# Patient Record
Sex: MALE | Race: WHITE | Employment: FULL TIME | URBAN - METROPOLITAN AREA
[De-identification: names, ages, dates, MRNs, and addresses within clinical notes are randomized per-mention and may not be internally consistent; named-entity substitution may affect disease eponyms.]

---

## 2020-03-11 ENCOUNTER — HOSPITAL ENCOUNTER (EMERGENCY)
Age: 50
Discharge: HOME | End: 2020-03-11
Attending: EMERGENCY MEDICINE
Payer: COMMERCIAL

## 2020-03-11 VITALS
BODY MASS INDEX: 30.92 KG/M2 | OXYGEN SATURATION: 99 % | WEIGHT: 221.7 LBS | RESPIRATION RATE: 16 BRPM | DIASTOLIC BLOOD PRESSURE: 87 MMHG | TEMPERATURE: 98.3 F | SYSTOLIC BLOOD PRESSURE: 133 MMHG | HEART RATE: 71 BPM

## 2020-03-11 PROCEDURE — 99282 EMERGENCY DEPT VISIT SF MDM: CPT

## 2020-03-11 PROCEDURE — 12001 RPR S/N/AX/GEN/TRNK 2.5CM/<: CPT

## 2020-03-11 ASSESSMENT — PAIN DESCRIPTION - LOCATION: LOCATION: HEAD

## 2020-03-11 ASSESSMENT — PAIN SCALES - GENERAL: PAINLEVEL_OUTOF10: 5

## 2020-03-11 ASSESSMENT — PAIN DESCRIPTION - PAIN TYPE: TYPE: ACUTE PAIN

## 2020-03-11 NOTE — ED TRIAGE NOTES
Pt states he was bent over and as he stood up hit his head on an AED cabinet. Small wound to scalp. States he feels \"fuzzy or cloudy in head. \" Last tetanus recently but unsure of date.

## 2020-03-11 NOTE — ED PROVIDER NOTES
TRIAGE CHIEF COMPLAINT:   Chief Complaint   Patient presents with    Abrasion    Head Injury         HPI: Bogdan Martins is a 52 y.o. male who presents to the Emergency Department with complaint of scalp laceration and head injury. The patient was working  and bent down to  a rock on the floor. He raised up quickly and hit the back of his scalp on the edge of a cabinet on the wall. He suffered a superficial laceration to the scalp. He fell backwards after he hit his head but there was no loss of consciousness. Denies numbness or weakness. No dizziness or vertigo. He has no neck pain or back pain. No nausea or vomiting. Denies any other injury. He does not take any blood thinners. Tetanus is up-to-date. This is a [facility] injury. REVIEW OF SYSTEMS:  6 systems reviewed. Pertinent positives per HPI. Otherwise noted to be negative. Nursing notes reviewed and agree with above. Past medical/surgical history reviewed. MEDICATIONS   Patient's Medications   New Prescriptions    No medications on file   Previous Medications    ATENOLOL (TENORMIN) 50 MG TABLET    Take 50 mg by mouth daily    DESVENLAFAXINE SUCCINATE (PRISTIQ PO)    Take by mouth    ESOMEPRAZOLE MAGNESIUM (NEXIUM PO)    Take by mouth    GUAIFENESIN (MUCINEX) 600 MG EXTENDED RELEASE TABLET    Take 2 tablets by mouth 2 times daily    HYDROXYZINE (ATARAX) 50 MG TABLET    Take 1 tablet by mouth 3 times daily as needed for Itching or Anxiety    NONFORMULARY    Cholesterol med    ONDANSETRON (ZOFRAN ODT) 4 MG DISINTEGRATING TABLET    Take 1 tablet by mouth every 8 hours as needed for Nausea or Vomiting    ROSUVASTATIN CALCIUM PO    Take by mouth   Modified Medications    No medications on file   Discontinued Medications    No medications on file         ALLERGIES No Known Allergies      /87   Pulse 71   Temp 98.3 °F (36.8 °C) (Oral)   Resp 16   Wt 100.6 kg (221 lb 11.2 oz)   SpO2 99%   BMI 30.92 kg/m²   General:  No acute distress. (Please note that portions of this note may have been completed with a voice recognition program.  Efforts were made to edit the dictation but occasionally words are mis-transcribed)        FINAL IMPRESSION:  1 --scalp laceration, 1 cm, repaired with skin adhesive  2 --head injury        Mary Griffith MD  03/11/20 323 Raymundo Hermosillo MD  03/11/20 323 Raymundo Hermosillo MD  03/11/20 [id number]

## 2020-03-11 NOTE — ED NOTES
Wound care per Dr. Shravan Heck. Pt states understanding of d/c instructions. Pt alert and oriented with steady gait upon discharge.       Anabela Turner RN  03/11/20 1100 [address] Keisha Cantu RN  03/11/20 1117